# Patient Record
Sex: FEMALE | Race: OTHER | HISPANIC OR LATINO
[De-identification: names, ages, dates, MRNs, and addresses within clinical notes are randomized per-mention and may not be internally consistent; named-entity substitution may affect disease eponyms.]

---
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
## 2023-01-31 ENCOUNTER — OUTPATIENT (OUTPATIENT)
Dept: OUTPATIENT SERVICES | Facility: HOSPITAL | Age: 18
LOS: 1 days | Discharge: HOME | End: 2023-01-31

## 2023-01-31 ENCOUNTER — APPOINTMENT (OUTPATIENT)
Dept: PEDIATRIC ADOLESCENT MEDICINE | Facility: CLINIC | Age: 18
End: 2023-01-31
Payer: SELF-PAY

## 2023-01-31 VITALS — HEART RATE: 80 BPM | DIASTOLIC BLOOD PRESSURE: 49 MMHG | SYSTOLIC BLOOD PRESSURE: 115 MMHG | TEMPERATURE: 98.3 F

## 2023-01-31 DIAGNOSIS — Z71.89 OTHER SPECIFIED COUNSELING: ICD-10-CM

## 2023-01-31 DIAGNOSIS — Z30.011 ENCOUNTER FOR INITIAL PRESCRIPTION OF CONTRACEPTIVE PILLS: ICD-10-CM

## 2023-01-31 PROCEDURE — 99212 OFFICE O/P EST SF 10 MIN: CPT | Mod: NC

## 2023-01-31 RX ORDER — NORGESTIMATE AND ETHINYL ESTRADIOL 7DAYSX3 LO
0.18/0.215/0.25 KIT ORAL
Refills: 0 | Status: COMPLETED | OUTPATIENT
Start: 2023-01-31

## 2023-01-31 RX ADMIN — NORGESTIMATE AND ETHINYL ESTRADIOL 0 MG-25 MCG: KIT at 00:00

## 2023-01-31 NOTE — ASSESSMENT
[FreeTextEntry1] : 17 year old female for family planning\par DEPO mechanism of action, s/e review\par labeled and dispensed Tri Lo Flower (#1 cycle) to take one tab po daily\par discussed use of Ibuprofen for management of menstrual bleeding\par declines condoms\par safe sexual practice counseling\par to f/u x one month for family planning\par  verbalizes understanding

## 2023-02-01 DIAGNOSIS — Z30.011 ENCOUNTER FOR INITIAL PRESCRIPTION OF CONTRACEPTIVE PILLS: ICD-10-CM

## 2023-02-01 DIAGNOSIS — Z71.89 OTHER SPECIFIED COUNSELING: ICD-10-CM

## 2023-02-01 DIAGNOSIS — Z30.09 ENCOUNTER FOR OTHER GENERAL COUNSELING AND ADVICE ON CONTRACEPTION: ICD-10-CM

## 2023-02-27 ENCOUNTER — OUTPATIENT (OUTPATIENT)
Dept: OUTPATIENT SERVICES | Facility: HOSPITAL | Age: 18
LOS: 1 days | End: 2023-02-27
Payer: COMMERCIAL

## 2023-02-27 ENCOUNTER — MED ADMIN CHARGE (OUTPATIENT)
Age: 18
End: 2023-02-27

## 2023-02-27 ENCOUNTER — APPOINTMENT (OUTPATIENT)
Age: 18
End: 2023-02-27
Payer: SELF-PAY

## 2023-02-27 ENCOUNTER — APPOINTMENT (OUTPATIENT)
Dept: PEDIATRIC ADOLESCENT MEDICINE | Facility: CLINIC | Age: 18
End: 2023-02-27

## 2023-02-27 VITALS — TEMPERATURE: 97.8 F | SYSTOLIC BLOOD PRESSURE: 116 MMHG | HEART RATE: 88 BPM | DIASTOLIC BLOOD PRESSURE: 76 MMHG

## 2023-02-27 DIAGNOSIS — Z30.09 ENCOUNTER FOR OTHER GENERAL COUNSELING AND ADVICE ON CONTRACEPTION: ICD-10-CM

## 2023-02-27 DIAGNOSIS — Z00.00 ENCOUNTER FOR GENERAL ADULT MEDICAL EXAMINATION WITHOUT ABNORMAL FINDINGS: ICD-10-CM

## 2023-02-27 DIAGNOSIS — Z30.42 ENCOUNTER FOR SURVEILLANCE OF INJECTABLE CONTRACEPTIVE: ICD-10-CM

## 2023-02-27 PROCEDURE — 99213 OFFICE O/P EST LOW 20 MIN: CPT | Mod: NC

## 2023-02-27 PROCEDURE — 99213 OFFICE O/P EST LOW 20 MIN: CPT

## 2023-02-27 RX ORDER — MEDROXYPROGESTERONE ACETATE 150 MG/ML
150 INJECTION, SUSPENSION INTRAMUSCULAR
Qty: 0 | Refills: 0 | Status: COMPLETED | OUTPATIENT
Start: 2023-02-27

## 2023-02-27 NOTE — ASSESSMENT
[FreeTextEntry1] : 17 year old female for female family planning\par administered DMPA IM left deltoid -tolerated well\par patient is considering other methods\par  education provided\par Nexplanon info given\par encouraged to f/u with any questions \par next appt 5/15/23 for DMPA\par condoms declined\par safe sexual practice counseling\par patient verbalizes understanding\par discharged stable\par

## 2023-02-27 NOTE — HISTORY OF PRESENT ILLNESS
[Definite:  ___ (Date)] : the last menstrual period was [unfilled] [Regular Cycle Intervals] : periods have been irregular [Sexually Active] : ~he/she~ is sexually active [Monogamous] : is monogamous [Contraception] : uses contraception [Medroxyprogesterone Injection] : uses medroxyprogesterone acetate injection

## 2023-02-28 DIAGNOSIS — Z30.42 ENCOUNTER FOR SURVEILLANCE OF INJECTABLE CONTRACEPTIVE: ICD-10-CM

## 2023-02-28 DIAGNOSIS — Z30.09 ENCOUNTER FOR OTHER GENERAL COUNSELING AND ADVICE ON CONTRACEPTION: ICD-10-CM

## 2023-03-15 ENCOUNTER — APPOINTMENT (OUTPATIENT)
Dept: PEDIATRIC ADOLESCENT MEDICINE | Facility: CLINIC | Age: 18
End: 2023-03-15

## 2023-03-15 ENCOUNTER — APPOINTMENT (OUTPATIENT)
Dept: PEDIATRIC ADOLESCENT MEDICINE | Facility: CLINIC | Age: 18
End: 2023-03-15
Payer: SELF-PAY

## 2023-03-15 ENCOUNTER — OUTPATIENT (OUTPATIENT)
Dept: OUTPATIENT SERVICES | Facility: HOSPITAL | Age: 18
LOS: 1 days | End: 2023-03-15
Payer: COMMERCIAL

## 2023-03-15 VITALS — TEMPERATURE: 98.1 F | SYSTOLIC BLOOD PRESSURE: 123 MMHG | HEART RATE: 84 BPM | DIASTOLIC BLOOD PRESSURE: 66 MMHG

## 2023-03-15 DIAGNOSIS — R45.89 OTHER SYMPTOMS AND SIGNS INVOLVING EMOTIONAL STATE: ICD-10-CM
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
## 2023-03-15 DIAGNOSIS — Z71.9 COUNSELING, UNSPECIFIED: ICD-10-CM
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
## 2023-03-15 DIAGNOSIS — Z00.00 ENCOUNTER FOR GENERAL ADULT MEDICAL EXAMINATION WITHOUT ABNORMAL FINDINGS: ICD-10-CM

## 2023-03-15 DIAGNOSIS — M54.9 DORSALGIA, UNSPECIFIED: ICD-10-CM

## 2023-03-15 DIAGNOSIS — Z13.31 ENCOUNTER FOR SCREENING FOR DEPRESSION: ICD-10-CM

## 2023-03-15 PROCEDURE — 99213 OFFICE O/P EST LOW 20 MIN: CPT

## 2023-03-15 PROCEDURE — 99213 OFFICE O/P EST LOW 20 MIN: CPT | Mod: NC

## 2023-03-15 RX ORDER — IBUPROFEN 200 MG/1
200 TABLET ORAL
Refills: 0 | Status: COMPLETED | OUTPATIENT
Start: 2023-03-15

## 2023-03-15 RX ADMIN — IBUPROFEN 1 MG: 200 TABLET ORAL at 00:00

## 2023-03-15 NOTE — HISTORY OF PRESENT ILLNESS
[FreeTextEntry6] : 17 y.o female presents to health center for back pain \par Works over night at a loading [employer], was rushing and lifted heavy item and injured back \par Came straight to school, did not eat or take anything for pain \par Currently rent a room and providers for her self \par Senior at [employer], doing well \par

## 2023-03-15 NOTE — DISCUSSION/SUMMARY
[FreeTextEntry1] : 17 y.o female present to health center for back pain \par V/S stable \par NKDA\par Motrin PO given, tolerated \par Water and snack provided \par Counseling/education provided on healing process and rest \par Feels over whelmed, has to work to support herself \par Emotional support provided \par Answered all questions and concerns \par Discuss rest for today \par Referred to [facility] \par Soft hand off, F/U if pain persist/ worsen

## 2023-03-17 ENCOUNTER — APPOINTMENT (OUTPATIENT)
Dept: PEDIATRIC ADOLESCENT MEDICINE | Facility: CLINIC | Age: 18
End: 2023-03-17

## 2023-04-26 ENCOUNTER — APPOINTMENT (OUTPATIENT)
Dept: PEDIATRIC ADOLESCENT MEDICINE | Facility: CLINIC | Age: 18
End: 2023-04-26
Payer: SELF-PAY

## 2023-04-26 ENCOUNTER — OUTPATIENT (OUTPATIENT)
Dept: OUTPATIENT SERVICES | Facility: HOSPITAL | Age: 18
LOS: 1 days | End: 2023-04-26
Payer: COMMERCIAL

## 2023-04-26 VITALS
WEIGHT: 179.4 LBS | OXYGEN SATURATION: 100 % | TEMPERATURE: 98 F | BODY MASS INDEX: 28.16 KG/M2 | DIASTOLIC BLOOD PRESSURE: 79 MMHG | SYSTOLIC BLOOD PRESSURE: 119 MMHG | HEIGHT: 66.97 IN | HEART RATE: 67 BPM

## 2023-04-26 DIAGNOSIS — Z00.00 ENCOUNTER FOR GENERAL ADULT MEDICAL EXAMINATION WITHOUT ABNORMAL FINDINGS: ICD-10-CM

## 2023-04-26 DIAGNOSIS — L08.9 LOCAL INFECTION OF THE SKIN AND SUBCUTANEOUS TISSUE, UNSPECIFIED: ICD-10-CM

## 2023-04-26 DIAGNOSIS — Z71.9 COUNSELING, UNSPECIFIED: ICD-10-CM

## 2023-04-26 PROCEDURE — 99212 OFFICE O/P EST SF 10 MIN: CPT | Mod: NC

## 2023-04-26 PROCEDURE — 99212 OFFICE O/P EST SF 10 MIN: CPT

## 2023-04-26 RX ORDER — HYDROCORTISONE 10 MG/G
1 CREAM TOPICAL
Refills: 0 | Status: COMPLETED | OUTPATIENT
Start: 2023-04-26

## 2023-04-26 RX ADMIN — HYDROCORTISONE 0 %: 10 CREAM TOPICAL at 00:00

## 2023-04-26 NOTE — HISTORY OF PRESENT ILLNESS
[FreeTextEntry6] : 17 y.o female presents to health center for irritation to left arm and flank \par was scratching area and cleansed with exfoliating luffa\par \par

## 2023-04-26 NOTE — PHYSICAL EXAM
[Regular Rate and Rhythm] : regular rate and rhythm [NL] : moves all extremities x4, warm, well perfused x4 [Dry] : dry

## 2023-04-26 NOTE — DISCUSSION/SUMMARY
[FreeTextEntry1] : 17 y.o female presents to health center for irritation to left upper arm and left flank \par V/S stable \par Hydrocortisone cream applied to region \par Counseling/education provided on skin care, hydration S/S of infection \par Discussed healing process/ stages \par Answered all questions and concerns \par Offered to call home, declined\par F/U if persist/ worsen \par D/C to class

## 2023-04-27 DIAGNOSIS — L08.9 LOCAL INFECTION OF THE SKIN AND SUBCUTANEOUS TISSUE, UNSPECIFIED: ICD-10-CM

## 2023-04-27 DIAGNOSIS — Z71.9 COUNSELING, UNSPECIFIED: ICD-10-CM

## 2023-05-15 ENCOUNTER — APPOINTMENT (OUTPATIENT)
Age: 18
End: 2023-05-15

## 2023-05-17 ENCOUNTER — OUTPATIENT (OUTPATIENT)
Dept: OUTPATIENT SERVICES | Facility: HOSPITAL | Age: 18
LOS: 1 days | End: 2023-05-17
Payer: MEDICAID

## 2023-05-17 ENCOUNTER — APPOINTMENT (OUTPATIENT)
Dept: PEDIATRIC ADOLESCENT MEDICINE | Facility: CLINIC | Age: 18
End: 2023-05-17
Payer: MEDICAID

## 2023-05-17 VITALS
WEIGHT: 179 LBS | HEART RATE: 74 BPM | SYSTOLIC BLOOD PRESSURE: 114 MMHG | HEIGHT: 66.97 IN | BODY MASS INDEX: 28.09 KG/M2 | OXYGEN SATURATION: 100 % | DIASTOLIC BLOOD PRESSURE: 73 MMHG | TEMPERATURE: 97.4 F

## 2023-05-17 DIAGNOSIS — Z70.9 SEX COUNSELING, UNSPECIFIED: ICD-10-CM

## 2023-05-17 DIAGNOSIS — N92.1 EXCESSIVE AND FREQUENT MENSTRUATION WITH IRREGULAR CYCLE: ICD-10-CM

## 2023-05-17 DIAGNOSIS — Z30.42 ENCOUNTER FOR SURVEILLANCE OF INJECTABLE CONTRACEPTIVE: ICD-10-CM

## 2023-05-17 DIAGNOSIS — Z00.00 ENCOUNTER FOR GENERAL ADULT MEDICAL EXAMINATION WITHOUT ABNORMAL FINDINGS: ICD-10-CM

## 2023-05-17 PROCEDURE — 99213 OFFICE O/P EST LOW 20 MIN: CPT | Mod: NC

## 2023-05-17 PROCEDURE — 99213 OFFICE O/P EST LOW 20 MIN: CPT | Mod: 25

## 2023-05-17 NOTE — HISTORY OF PRESENT ILLNESS
[FreeTextEntry1] : 17 y.o female presents to health center for DMPA \par C/O intermittent breakthrough bleeding\par LMP 4/26 \par Currently sexually active with male partner, sometimes uses condoms \par Does well, denies anxiety, depression thoughts of self harm \par Senior at [employer] [Approximately ___ (Month)] : the LMP was approximately [unfilled] month(s) ago [Sexually Active] : ~he/she~ is sexually active [Monogamous] : is monogamous [Contraception] : uses contraception [Vaginal] : vaginal [Male ___] : [unfilled] male

## 2023-05-17 NOTE — ASSESSMENT
[FreeTextEntry1] : 17 y.o female presents to health center for DMPA\par V/S stable \par NKDA\par Depo preferred BC method\par Counseling/education provided on mechanism of BC and safe sex practice \par F/U week of 8/2 for next DMPA, or sooner if needed \par Answered all questions and concerns \par Safe D/C to class \par  \par \par

## 2023-05-18 DIAGNOSIS — N92.1 EXCESSIVE AND FREQUENT MENSTRUATION WITH IRREGULAR CYCLE: ICD-10-CM

## 2023-05-18 DIAGNOSIS — Z30.42 ENCOUNTER FOR SURVEILLANCE OF INJECTABLE CONTRACEPTIVE: ICD-10-CM

## 2023-05-18 DIAGNOSIS — Z70.9 SEX COUNSELING, UNSPECIFIED: ICD-10-CM

## 2023-08-04 ENCOUNTER — APPOINTMENT (OUTPATIENT)
Dept: PEDIATRIC ADOLESCENT MEDICINE | Facility: CLINIC | Age: 18
End: 2023-08-04